# Patient Record
Sex: MALE | URBAN - METROPOLITAN AREA
[De-identification: names, ages, dates, MRNs, and addresses within clinical notes are randomized per-mention and may not be internally consistent; named-entity substitution may affect disease eponyms.]

---
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
## 2025-01-02 ENCOUNTER — OFFICE VISIT (OUTPATIENT)
Dept: OBGYN CLINIC | Facility: CLINIC | Age: 20
End: 2025-01-02
Payer: COMMERCIAL

## 2025-01-02 VITALS — BODY MASS INDEX: 31.29 KG/M2 | WEIGHT: 231 LBS | HEIGHT: 72 IN

## 2025-01-02 DIAGNOSIS — S83.282A ACUTE LATERAL MENISCUS TEAR OF LEFT KNEE, INITIAL ENCOUNTER: ICD-10-CM

## 2025-01-02 DIAGNOSIS — S83.512S RUPTURE OF ANTERIOR CRUCIATE LIGAMENT OF LEFT KNEE, SEQUELA: Primary | ICD-10-CM

## 2025-01-02 PROCEDURE — 99213 OFFICE O/P EST LOW 20 MIN: CPT | Performed by: STUDENT IN AN ORGANIZED HEALTH CARE EDUCATION/TRAINING PROGRAM

## 2025-01-02 NOTE — PROGRESS NOTES
Ortho Sports Medicine Knee Follow Up Visit     Assesment:     19 y.o. male nearly 6 months status post left knee arthroscopy with ACL reconstruction using quadriceps tendon autograft and lateral meniscus repair performed on 7/9/2024    Plan:  The patient's diagnosis and treatment were discussed at length today. We discussed no treatment, non-operative treatment, and operative treatment.    Efraín presents today nearly 6 months status post left knee arthroscopy with ACL reconstruction using quadriceps tendon autograft and lateral meniscus pair performed 7/9/2024.  He is overall doing extremely well at this time.  We did have a lengthy discussion on continuing outpatient physical therapy versus transition to home exercise program and he mentioned that he will discuss with his mom his co-pay about whether or not he wishes to continue outpatient PT.  We also had a lengthy discussion on his return to bowling and I discussed with him he should use caution as his left leg is his plant leg. I explained this is somewhat of a high risk maneuver and it would be safer to avoid.  Lastly I discussed with him it is still too soon to do any skiing and he was in agreement with this.  He can continue ice and over-the-counter medication as needed for pain relief.  I will see him back in 3 months for 1 last follow-up visit.    Conservative treatment:    Ice to knee for 20 minutes at least 1-2 times daily.  OTC NSAIDS prn for pain.  Discussed return to bowling as his left leg is his plant leg.  Discussed too soon to return to skiing.   Discussed continuing PT vs. Home Exercise Program.    Imaging:    All imaging from today was reviewed by myself and explained to the patient.       Injection:    No Injection planned at this time.      Surgery:     No surgery is recommended at this point, continue with conservative treatment plan as noted.    Follow up:    Return in about 3 months (around 4/2/2025) for Recheck.        Chief Complaint    Patient presents with    Left Knee - Follow-up     Feeling really good          History of Present Illness:    The patient is returns for follow up nearly 6 months status post left knee arthroscopy with ACL reconstruction using quadriceps tendon autograft and lateral meniscus repair performed on 7/9/2024.  He states that he is overall doing well at this time.  He continues to be compliant with outpatient physical therapy progressing on his strength.  He continues to be compliant with his activity restrictions.  He does get some discomfort after prolonged activity, however it improves with rest. He denies any instability.  He denies any mechanical catching or locking.  He denies any new injury or trauma.  He denies any numbness or tingling.  He is overall happy with his progression and postsurgical outcomes.    Past Medical, Social and Family History:  History reviewed. No pertinent past medical history.  Past Surgical History:   Procedure Laterality Date    OK ARTHRS AIDED ANT CRUCIATE LIGM RPR/AGMNTJ/RCNSTJ Left 7/9/2024    Procedure: ARTHROSCOPIC RECONSTRUCTION ANTERIOR CRUCIATE LIGAMENT (ACL) WITH AUTOGRAFT, LATERAL MENISCUS REPAIR;  Surgeon: Abel Lorenzo DO;  Location: [facility] OR;  Service: Orthopedics     No Known Allergies  Current Outpatient Medications on File Prior to Visit   Medication Sig Dispense Refill    naproxen (NAPROSYN) 250 mg tablet Take 250 mg by mouth 2 (two) times a day with meals (Patient taking differently: Take 250 mg by mouth as needed)       No current facility-administered medications on file prior to visit.     Social History     Socioeconomic History    Marital status: Single     Spouse name: Not on file    Number of children: Not on file    Years of education: Not on file    Highest education level: Not on file   Occupational History    Not on file   Tobacco Use    Smoking status: Never     Passive exposure: Never    Smokeless tobacco: Never   Substance and Sexual Activity     Alcohol use: Never    Drug use: Yes     Types: Marijuana    Sexual activity: Not on file   Other Topics Concern    Not on file   Social History Narrative    Not on file     Social Drivers of Health     Financial Resource Strain: Not on file   Food Insecurity: Not on file   Transportation Needs: Not on file   Physical Activity: Not on file   Stress: Not on file   Social Connections: Not on file   Intimate Partner Violence: Not on file   Housing Stability: Not on file         I have reviewed the past medical, surgical, social and family history, medications and allergies as documented in the EMR.    Review of systems: ROS is negative other than that noted in the HPI.  Constitutional: Negative for fatigue and fever.      Physical Exam:    Height 6' (1.829 m), weight 105 kg (231 lb).    General/Constitutional: NAD, well developed, well nourished  HENT: Normocephalic, atraumatic  CV: Intact distal pulses, regular rate  Resp: No respiratory distress or labored breathing  Lymphatic: No lymphadenopathy palpated  Neuro: Alert and Oriented x 3, no focal deficits  Psych: Normal mood, normal affect, normal judgement, normal behavior  Skin: Warm, dry, no rashes, no erythema      Knee Exam (focused):  Visual inspection of the Left knee demonstrates normal contour without atrophy.   Some shakiness with left single-leg squat  Healed previous incisions   There is no significant erythema or edema.    No significant joint effusion   Range of motion is full from 0-130 degrees of flexion   Able to straight leg raise   No incisional tender to palpation  - medial joint line tenderness, - lateral joint line tenderness  - medial Jose's, - lateral Jose's  1A Lachman exam, Stable posterior drawer  Stable to varus and valgus stress at both 0 and 30°  Patella tracks normally.  No J sign.  No apprehension.  Translation is approximately 2 quadrants and is equal to the contralateral side  Patellar eversion is similar to the contralateral  side    Examination of the patient's ipsilateral hip demonstrates full painless range of motion.  No crepitus.           LE NV Exam: +2 DP/PT pulses bilaterally  Sensation intact to light touch L2-S1 bilaterally    No calf tenderness to palpation bilaterally      Knee Imaging    No imaging was performed today      Scribe Attestation      I,:  Demar Murphy am acting as a scribe while in the presence of the attending physician.:       I,:  Abel Lorenzo, DO personally performed the services described in this documentation    as scribed in my presence.:

## 2025-01-06 ENCOUNTER — OFFICE VISIT (OUTPATIENT)
Dept: PHYSICAL THERAPY | Facility: CLINIC | Age: 20
End: 2025-01-06
Payer: COMMERCIAL

## 2025-01-06 DIAGNOSIS — S83.512S RUPTURE OF ANTERIOR CRUCIATE LIGAMENT OF LEFT KNEE, SEQUELA: Primary | ICD-10-CM

## 2025-01-06 DIAGNOSIS — S83.282A ACUTE LATERAL MENISCUS TEAR OF LEFT KNEE, INITIAL ENCOUNTER: ICD-10-CM

## 2025-01-06 PROCEDURE — 97112 NEUROMUSCULAR REEDUCATION: CPT

## 2025-01-06 PROCEDURE — 97110 THERAPEUTIC EXERCISES: CPT

## 2025-01-06 PROCEDURE — 97530 THERAPEUTIC ACTIVITIES: CPT

## 2025-01-06 NOTE — PROGRESS NOTES
"Daily Note     Today's date: 2025  Patient name: Efraín Bo  : 2005  MRN: [id number]  Referring provider: Abel Lorenzo DO  Dx:   Encounter Diagnosis     ICD-10-CM    1. Rupture of anterior cruciate ligament of left knee, sequela  S83.512S       2. Acute lateral meniscus tear of left knee, initial encounter  S83.282A                      Subjective: Pt reports he is doing really well, just some mild soreness after long days at work.       Objective: See treatment diary below      Assessment: Tolerated treatment well. Continued to work on single leg stability and strengthening as well as quick lateral movements with good tolerance. He also introduces a light job 30\" on 30\" off which he did very well with. Patient demonstrated fatigue post treatment, exhibited good technique with therapeutic exercises, and would benefit from continued PT      Plan: Continue per plan of care.  Progress treatment as tolerated.       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[employer], skiing, bowling  Impairments: L knee flexion ROM, quad control/strength, LLE strength  MedBridge Code: [email or web address]   POC expiration: 2025      POC Expires Reeval for Medicare to be completed Unit LImit Auth Expiration Date PT/OT/STVisit Limit   2025 By visit   N/a N/a 24 60    Completed on visit                  TREATMENT DIARY    Auth Status:  Approved 11/5 11/11 11/25 12/3 12/9 12/16 12/23 12/31 1/6   Visit # 24 25 26 27 28 29 30 31 32   Visits remaining  36 35 34 33 32 31 30 29 28               Manuals                                                            Neuro Re-Ed            SLS on foam            SL clock balance            SLS Trampoline " "ball toss            Ther Ex            Bike for ROM 8' 8' 8' 8' Elliptical 8' 8' 8' TM 8' 8'   Mini squat BOSU 20x BOSU 20x          Leg press 2x10 LLE   85# 2x10 LLE   85# 2x10 LLE   85# 2x10 LLE 85#  2x10 LLE 85#  2x10 LLE 95#  2x10 LLE 95#  2x10 LLE  LLE  10x2   Fwd lunge BOSU 20x BOSU 20x          Lateral lunge BOSU 20x BOSU 20x          SL pistol squat to chair        2x10 10x2   SL sit to stand on high low table       x10      Seated leg ext machine  15#  10x2 15#  10x2 15# 2x10 15# 2x10  25#  10x2 25# 2x10 35#  10x2   Eccentric fwd step down            High step ups       12\" step up   20# KB in L hand 12\" step up   20# KB in L hand  2x10 12\" step up   20# KB in L hand  2x10   Lateral step down      Quick  10x2 Quick  10x2 Quick 2x10 Quick  10x2   Eccentric step  heel tap downs            CC walk outs  99# f-b 88# s-s x10ea                       Ther Activity            Fwd landing off step onto LLE 6\" step 10x 6\" step 10x 6\" step 10x 8\" step 10x 8\" step x10 8\" step x10 8\" step x10 8\" step x10 8\" step x10   Fwd landing onto LLE off step into lateral hop to R 6\" step 10x 6\" step 10x 6\" step 10x 8\" step x10 8\" step x10 8\" step x10 8\" step x10 8\" step x10 8\" step x10   Reformer hops    LLE 2x10 LLE 2x10       Standing hops f-b, s-s 10ea X10 ea X10 ea 10ea 10ea X10 ea X10 ea 10ea 10   Skaters jumps  X10 ea X10 ea 10ea 10ea X10 ea X10 ea 10ea 10   Single leg hop in place    10 10x x10 x10 10x 10   High knees/butt kicks    2 laps ea 2 laps ea 2 laps 2 laps 2 laps 2 laps   Agility Ladder  6 laps  6 laps nv       Side shuffles   2 laps 2 laps 2 laps 2 laps 2 laps 2 laps 2 laps   Carioca     2 laps 2 laps 2 laps 2 laps 2 laps 2 laps   Fwd jog    2 laps nv       Gait Training                                    Modalities                                               "

## 2025-01-13 ENCOUNTER — OFFICE VISIT (OUTPATIENT)
Dept: PHYSICAL THERAPY | Facility: CLINIC | Age: 20
End: 2025-01-13
Payer: COMMERCIAL

## 2025-01-13 DIAGNOSIS — S83.512S RUPTURE OF ANTERIOR CRUCIATE LIGAMENT OF LEFT KNEE, SEQUELA: Primary | ICD-10-CM

## 2025-01-13 DIAGNOSIS — S83.282A ACUTE LATERAL MENISCUS TEAR OF LEFT KNEE, INITIAL ENCOUNTER: ICD-10-CM

## 2025-01-13 PROCEDURE — 97530 THERAPEUTIC ACTIVITIES: CPT

## 2025-01-13 PROCEDURE — 97110 THERAPEUTIC EXERCISES: CPT

## 2025-01-13 PROCEDURE — 97112 NEUROMUSCULAR REEDUCATION: CPT

## 2025-01-13 NOTE — PROGRESS NOTES
Daily Note     Today's date: 2025  Patient name: Efraín Bo  : 2005  MRN: [id number]  Referring provider: Abel Lorenzo DO  Dx:   Encounter Diagnosis     ICD-10-CM    1. Rupture of anterior cruciate ligament of left knee, sequela  S83.512S       2. Acute lateral meniscus tear of left knee, initial encounter  S83.282A                      Subjective: Pt reports he is doing really well, just some mild soreness after long days at work.       Objective: See treatment diary below      Assessment: Tolerated treatment well. Continued to work on single leg stability and strengthening as well as quick lateral movements with good tolerance. Doing well with jogging, no pain in knee but becomes SOB quickly. Patient demonstrated fatigue post treatment, exhibited good technique with therapeutic exercises, and would benefit from continued PT      Plan: Continue per plan of care.  Progress treatment as tolerated.       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[employer], skiing, bowling  Impairments: L knee flexion ROM, quad control/strength, LLE strength  MedBridge Code: [email or web address]   POC expiration: 2025      POC Expires Reeval for Medicare to be completed Unit LImit Auth Expiration Date PT/OT/STVisit Limit   2025 By visit   N/a N/a 24 60    Completed on visit                  TREATMENT DIARY    Auth Status:  Approved 12/3 12/9 12/16 12/23 12/31 1/6 1/13   Visit # 27 28 29 30 31 32 33   Visits remaining  33 32 31 30 29 28 27             Manuals                                                  Neuro Re-Ed          SLS on foam          SL clock balance          SLS Trampoline ball toss          Ther Ex          Bike for ROM 8'  "Elliptical 8' 8' 8' TM 8' 8' 6'   Mini squat       Bosu w. # ball toss  x20   Leg press 2x10 LLE 85#  2x10 LLE 85#  2x10 LLE 95#  2x10 LLE 95#  2x10 LLE  LLE  10x2 LLE  105#  10x2   Fwd lunge          Lateral lunge          SL pistol squat to chair     2x10 10x2 10x2   SL sit to stand on high low table    x10       Seated leg ext machine 15# 2x10 15# 2x10  25#  10x2 25# 2x10 35#  10x2 35#  10x2   Eccentric fwd step down          High step ups    12\" step up   20# KB in L hand 12\" step up   20# KB in L hand  2x10 12\" step up   20# KB in L hand  2x10 12\" step up   20# KB in L hand  2x10   Lateral step down   Quick  10x2 Quick  10x2 Quick 2x10 Quick  10x2    Eccentric step  heel tap downs          CC walk outs                     Ther Activity          Fwd landing off step onto LLE 8\" step 10x 8\" step x10 8\" step x10 8\" step x10 8\" step x10 8\" step x10 8\" step x10   Fwd landing onto LLE off step into lateral hop to R 8\" step x10 8\" step x10 8\" step x10 8\" step x10 8\" step x10 8\" step x10 8\" step x10   Reformer hops LLE 2x10 LLE 2x10        Standing hops f-b, s-s 10ea 10ea X10 ea X10 ea 10ea 10 10   Skaters jumps 10ea 10ea X10 ea X10 ea 10ea 10 10   Single leg hop in place 10 10x x10 x10 10x 10 10   High knees/butt kicks 2 laps ea 2 laps ea 2 laps 2 laps 2 laps 2 laps 2 laps   Agility Ladder 6 laps nv        Side shuffles 2 laps 2 laps 2 laps 2 laps 2 laps 2 laps 2 laps   Carioca  2 laps 2 laps 2 laps 2 laps 2 laps 2 laps 2 laps   Fwd jog 2 laps nv     TM 6 min 30\" on 30\"off up to 5.3 mph   Gait Training                              Modalities                                         "

## 2025-01-20 ENCOUNTER — OFFICE VISIT (OUTPATIENT)
Dept: PHYSICAL THERAPY | Facility: CLINIC | Age: 20
End: 2025-01-20
Payer: COMMERCIAL

## 2025-01-20 DIAGNOSIS — S83.282A ACUTE LATERAL MENISCUS TEAR OF LEFT KNEE, INITIAL ENCOUNTER: ICD-10-CM

## 2025-01-20 DIAGNOSIS — S83.512S RUPTURE OF ANTERIOR CRUCIATE LIGAMENT OF LEFT KNEE, SEQUELA: Primary | ICD-10-CM

## 2025-01-20 PROCEDURE — 97112 NEUROMUSCULAR REEDUCATION: CPT

## 2025-01-20 PROCEDURE — 97110 THERAPEUTIC EXERCISES: CPT

## 2025-01-20 PROCEDURE — 97530 THERAPEUTIC ACTIVITIES: CPT

## 2025-01-20 NOTE — PROGRESS NOTES
Daily Note     Today's date: 2025  Patient name: Efraín Bo  : 2005  MRN: [id number]  Referring provider: Abel Lorenzo DO  Dx:   Encounter Diagnosis     ICD-10-CM    1. Rupture of anterior cruciate ligament of left knee, sequela  S83.512S       2. Acute lateral meniscus tear of left knee, initial encounter  S83.282A                      Subjective: Pt reports he had some discomfort of the L HS today, unsure of why besides some snow shoveling before he came to therapy.       Objective: See treatment diary below      Assessment: Tolerated treatment well. Continued to work on single leg stability and strengthening as well as quick lateral movements with good tolerance. Has some difficulty maintaining his SLS when hopping onto the LLE.  Patient demonstrated fatigue post treatment, exhibited good technique with therapeutic exercises, and would benefit from continued PT      Plan: Continue per plan of care.  Progress treatment as tolerated.       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[employer], skiing, bowling  Impairments: L knee flexion ROM, quad control/strength, LLE strength  MedBridge Code: [email or web address]   POC expiration: 2025      POC Expires Reeval for Medicare to be completed Unit LImit Auth Expiration Date PT/OT/STVisit Limit   2025 By visit   N/a N/a 24 60    Completed on visit                  TREATMENT DIARY    Auth Status:  Approved 12/3 12/9 12/16 12/23 12/31 1/6 1/13 1/20   Visit # 27 28 29 30 31 32 33 34   Visits remaining  33 32 31 30 29 28 27 26              Manuals                                                       Neuro Re-Ed           SLS on foam           SL clock balance           SLS  "Trampoline ball toss           Ther Ex           Bike for ROM 8' Elliptical 8' 8' 8' TM 8' 8' 6' 6'   Mini squat       Bosu w. # ball toss  x20 Bosu w. # ball toss  x20   Leg press 2x10 LLE 85#  2x10 LLE 85#  2x10 LLE 95#  2x10 LLE 95#  2x10 LLE  LLE  10x2 LLE  105#  10x2 LLE  105#  10x2   Fwd lunge           Lateral lunge           SL pistol squat to chair     2x10 10x2 10x2 10x2   SL sit to stand on high low table    x10        Seated leg ext machine 15# 2x10 15# 2x10  25#  10x2 25# 2x10 35#  10x2 35#  10x2 35#  10x2   Seated Leg Curl Machine        35#  10x2   Eccentric fwd step down           High step ups    12\" step up   20# KB in L hand 12\" step up   20# KB in L hand  2x10 12\" step up   20# KB in L hand  2x10 12\" step up   20# KB in L hand  2x10 12\" step up   20# KB in L hand  2x10   Lateral step down   Quick  10x2 Quick  10x2 Quick 2x10 Quick  10x2     Eccentric step  heel tap downs           CC walk outs                       Ther Activity           Fwd landing off step onto LLE 8\" step 10x 8\" step x10 8\" step x10 8\" step x10 8\" step x10 8\" step x10 8\" step x10 8\" step x10   Fwd landing onto LLE off step into lateral hop to R 8\" step x10 8\" step x10 8\" step x10 8\" step x10 8\" step x10 8\" step x10 8\" step x10 8\" step x10   Reformer hops LLE 2x10 LLE 2x10         Standing hops f-b, s-s 10ea 10ea X10 ea X10 ea 10ea 10 10 10   Skaters jumps 10ea 10ea X10 ea X10 ea 10ea 10 10 10   Single leg hop in place 10 10x x10 x10 10x 10 10 10   High knees/butt kicks 2 laps ea 2 laps ea 2 laps 2 laps 2 laps 2 laps 2 laps 2 laps   Agility Ladder 6 laps nv         Side shuffles 2 laps 2 laps 2 laps 2 laps 2 laps 2 laps 2 laps 2 laps   Carioca  2 laps 2 laps 2 laps 2 laps 2 laps 2 laps 2 laps 2 laps   Fwd jog 2 laps nv     TM 6 min 30\" on 30\"off up to 5.3 mph nv   Gait Training                                 Modalities                                            "

## 2025-01-27 ENCOUNTER — OFFICE VISIT (OUTPATIENT)
Dept: PHYSICAL THERAPY | Facility: CLINIC | Age: 20
End: 2025-01-27
Payer: COMMERCIAL

## 2025-01-27 DIAGNOSIS — S83.282A ACUTE LATERAL MENISCUS TEAR OF LEFT KNEE, INITIAL ENCOUNTER: ICD-10-CM

## 2025-01-27 DIAGNOSIS — S83.512S RUPTURE OF ANTERIOR CRUCIATE LIGAMENT OF LEFT KNEE, SEQUELA: Primary | ICD-10-CM

## 2025-01-27 PROCEDURE — 97110 THERAPEUTIC EXERCISES: CPT

## 2025-01-27 PROCEDURE — 97530 THERAPEUTIC ACTIVITIES: CPT

## 2025-01-27 PROCEDURE — 97112 NEUROMUSCULAR REEDUCATION: CPT

## 2025-01-27 NOTE — PROGRESS NOTES
Daily Note     Today's date: 2025  Patient name: Efraín Bo  : 2005  MRN: [id number]  Referring provider: Abel Lorenzo DO  Dx:   Encounter Diagnosis     ICD-10-CM    1. Rupture of anterior cruciate ligament of left knee, sequela  S83.512S       2. Acute lateral meniscus tear of left knee, initial encounter  S83.282A                      Subjective: Pt reports he was really busy with work all weekend and his knee felt good t/o.     Objective: See treatment diary below      Assessment: Tolerated treatment well. Continued to work on single leg stability and strengthening as well as quick lateral movements with good tolerance. Has some difficulty maintaining his SLS when hopping onto the LLE. Jogging lighter on his feet with no pain but still quickly becomes SOB.   Patient demonstrated fatigue post treatment, exhibited good technique with therapeutic exercises, and would benefit from continued PT      Plan: Continue per plan of care.  Progress treatment as tolerated.       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[employer], skiing, bowling  Impairments: L knee flexion ROM, quad control/strength, LLE strength  MedBridge Code: [email or web address]   POC expiration: 2025      POC Expires Reeval for Medicare to be completed Unit LImit Auth Expiration Date PT/OT/STVisit Limit   2025 By visit   N/a N/a 24 60    Completed on visit                  TREATMENT DIARY    Auth Status:  Approved 12/3 12/9 12/16 12/23 12/31 1/6 1/13 1/20 1/27   Visit # 27 28 29 30 31 32 33 34 35   Visits remaining  33 32 31 30 29 28 27 26 25               Manuals                                                            Neuro Re-Ed            SLS on foam  "           SL clock balance            SLS Trampoline ball toss            Ther Ex            Bike for ROM 8' Elliptical 8' 8' 8' TM 8' 8' 6' 6' 6'   Mini squat       Bosu w. # ball toss  x20 Bosu w. # ball toss  x20 Bosu w. # ball toss  x20   Leg press 2x10 LLE 85#  2x10 LLE 85#  2x10 LLE 95#  2x10 LLE 95#  2x10 LLE  LLE  10x2 LLE  105#  10x2 LLE  105#  10x2 LLE  105#  10x2   Fwd lunge            Lateral lunge            SL pistol squat to chair     2x10 10x2 10x2 10x2 10x2   SL sit to stand on high low table    x10         Seated leg ext machine 15# 2x10 15# 2x10  25#  10x2 25# 2x10 35#  10x2 35#  10x2 35#  10x2 40#  10x2   Seated Leg Curl Machine        35#  10x2 40#  10x2   Eccentric fwd step down            High step ups    12\" step up   20# KB in L hand 12\" step up   20# KB in L hand  2x10 12\" step up   20# KB in L hand  2x10 12\" step up   20# KB in L hand  2x10 12\" step up   20# KB in L hand  2x10 12\" step up   20# KB in L hand  2x10   Lateral step down   Quick  10x2 Quick  10x2 Quick 2x10 Quick  10x2      Eccentric step  heel tap downs            CC walk outs                         Ther Activity            Fwd landing off step onto LLE 8\" step 10x 8\" step x10 8\" step x10 8\" step x10 8\" step x10 8\" step x10 8\" step x10 8\" step x10 8\" step x10   Fwd landing onto LLE off step into lateral hop to R 8\" step x10 8\" step x10 8\" step x10 8\" step x10 8\" step x10 8\" step x10 8\" step x10 8\" step x10 8\" step x10   Reformer hops LLE 2x10 LLE 2x10          Standing hops f-b, s-s 10ea 10ea X10 ea X10 ea 10ea 10 10 10 10   Skaters jumps 10ea 10ea X10 ea X10 ea 10ea 10 10 10 10   Single leg hop in place 10 10x x10 x10 10x 10 10 10 10   High knees/butt kicks 2 laps ea 2 laps ea 2 laps 2 laps 2 laps 2 laps 2 laps 2 laps 2 laps   Agility Ladder 6 laps nv          Side shuffles 2 laps 2 laps 2 laps 2 laps 2 laps 2 laps 2 laps 2 laps 2 laps   [place]ari[place]  2 laps 2 laps 2 laps 2 laps 2 laps 2 laps 2 laps 2 laps 2 laps   Fwd jog 2 " "laps nv     TM 6 min 30\" on 30\"off up to 5.3 mph nv TM 6 min 30\" on 30\"off up to 5.3 mph   Gait Training                                    Modalities                                               "

## 2025-02-04 ENCOUNTER — EVALUATION (OUTPATIENT)
Dept: PHYSICAL THERAPY | Facility: CLINIC | Age: 20
End: 2025-02-04
Payer: COMMERCIAL

## 2025-02-04 ENCOUNTER — TELEPHONE (OUTPATIENT)
Dept: OBGYN CLINIC | Facility: HOSPITAL | Age: 20
End: 2025-02-04

## 2025-02-04 DIAGNOSIS — S83.512S RUPTURE OF ANTERIOR CRUCIATE LIGAMENT OF LEFT KNEE, SEQUELA: Primary | ICD-10-CM

## 2025-02-04 DIAGNOSIS — S83.282A ACUTE LATERAL MENISCUS TEAR OF LEFT KNEE, INITIAL ENCOUNTER: ICD-10-CM

## 2025-02-04 PROCEDURE — 97110 THERAPEUTIC EXERCISES: CPT

## 2025-02-04 PROCEDURE — 97112 NEUROMUSCULAR REEDUCATION: CPT

## 2025-02-04 NOTE — PROGRESS NOTES
PT Re-Evaluation     Today's date: 2025  Patient name: Efraín Bo  : 2005  MRN: [id number]  Referring provider: Abel Lorenzo DO  Dx:   Encounter Diagnosis     ICD-10-CM    1. Rupture of anterior cruciate ligament of left knee, sequela  S83.512S       2. Acute lateral meniscus tear of left knee, initial encounter  S83.282A                      Assessment  Impairments: activity intolerance    Assessment details: Efraín Bo is a pleasant 18 y.o. male who presents with L surgical arthroscopy of the left knee with lateral meniscus repair and ACL autograft quadriceps reconstruction on 2024. He is ambulating with bilateral axillary crutches w/ TTWB. He has decreased L knee flexion ROM, poor quad activation, decreased L hip strength. 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 These signs and symptoms are consistent with Rupture of anterior cruciate ligament of left knee, sequela, Acute lateral meniscus tear of left knee, subsequent encounter. He will benefit from skilled PT to address impairments and return to PLOF    9/3/2024: Patient presents 8 weeks s/p L surgical arthroscopy of L knee with lateral meniscus repair and ACL autograft quadriceps reconstruction on 2024. He presents with normal gait without brace. He has normal L hip strength, but does have L knee weakness as expected 8 weeks post op. He has returned to work recently with no increase in pain. He is not yet back to descending stairs reciprocally or skiing or bowling. He will benefit from skilled PT to continue addressing impairments and progressing through protocol to return to PLOF    2024: Patient is 17 weeks s/p L surgical arthroscopy of L knee with lateral meniscus repair and ACL autograft quadriceps reconstruction on 2024. He has normal hip and knee strength and normal L knee ROM. He does demonstrate asymmetry between L and R during hop tests. These  impairments limit him with running, lateral movements at work, being on his feet all day at work, bowling, skiing, descending stairs. He will benefit from skilled PT to address impairments and return to PLOF    12/31/2024: Patient is 6 months s/p L surgical arthroscopy of L knee with lateral meniscus repair and ACL autograft quadriceps reconstruction on 7/9/2024. He continues to have normal hip and knee ROM. He has weakness of L knee which is observed during functional tests like a single leg squat/single leg chair squat. His hop tests have improved a lot since last re-evaluation but is still limited compared to RLE. He is limited primarily with skiing and bowling which he has not yet tried. Discussed with him continuing PT vs not continuing. I feel he can benefit from continued strengthening of his LLE for more functional activities and with plyometrics. He could do this with an HEP but I have concern that with a busy work schedule/lifestyle he may not be as diligent with an HEP to continue maximizing and progressing strength which is why he could benefit from continued PT.    2/4/2025: Patient is 7 months s/p L surgical arthroscopy of L knee with lateral meniscus repair and ACL autograft quadriceps reconstruction on 7/9/2024. He continues to have normal and good L knee ROM and resistive strength testing. His hop tests have improved and are relatively equal to contralateral limb. His single leg chair squat has also improved. He at this time has very minimal impairments that will continue to progress with HEP. He is not limited functionally. Discussed plan of PT with him to trial independent HEP through end of February 2025. He may within that time schedule a follow up with PT if needed, otherwise will discharge him on 3/1/2025.     Prognosis details: Positive prognostic indicators include positive attitude toward recovery, motivated to improve, high self-efficacy, good understanding of condition, realistic  expectations.  Negative prognostic indicators include chronicity of symptoms    Goals  STG to be achieved in 6 weeks  Patient will have improved L knee flexion ROM to at least 120 degrees.(Met)  Patient will have improved gross L knee and hip strength to 4+/5 (met)  Ambulate with no AD(met)  Ascend/descend stairs non reciprocal without AD(met)  Patient will be independent with HEP.(Met)    LTG to be achieved in 12 weeks  Patient will be able to ascend and descend stairs reciprocally no AD(met)  Patient will be able to return to work full time(met)    New STG to be met 6 weeks from 11/5  -able to run(met)  -able to descend stairs reciprocally(met)  -single hop and triple hop test WNLs(ongoing - good progress)    New LTG to be met 12 weeks from 11/5  -able to ski(ongoing)  -able to bowl(not yet tried by patient)      Plan  Patient would benefit from: skilled physical therapy  Planned modality interventions: cryotherapy and thermotherapy: hydrocollator packs    Planned therapy interventions: balance, home exercise program, gait training, functional ROM exercises, body mechanics training, joint mobilization, manual therapy, neuromuscular re-education, therapeutic exercise, therapeutic activities, stretching, strengthening, flexibility and patient/caregiver education    Frequency: 1x week (as needed)  Plan of Care beginning date: 2/4/2025  Plan of Care expiration date: 3/1/2025  Treatment plan discussed with: patient        Subjective Evaluation    History of Present Illness  Date of surgery: 7/9/2024  Mechanism of injury: L surgical arthroscopy of the left knee with lateral meniscus repair and ACL autograft quadriceps reconstruction on 7/9/2024. Notes overall pain has been minimal    Ambulating with crutches - TTWB for next 2 weeks. Is going up/down the stairs one at a time. Is icing and elevating. Not yet driving    Starts [employer] in late August - standing, squatting, on his feet, and turning. Wants to go back  to skiing and bowling    9/3/2024: Patient went back to work over this past weekend - notes both knees were a lot sore which he expected with just going back. He did wear a brace at work. Is going up the stairs normally but still has difficulty with going down the stairs normally. Has not went bowling yet. Overall doing well and does not wear the brace for normal daily activities.     2024: Patient notes doing well overall. He still gets soreness after standing/walking a lot at work. Has not yet tried bowling. He does not feel confident in running yet. He has some difficulty with going down the stairs too.     2024: Patient is doing well. Notes more pain in his R knee compared to his L knee. He has not yet done any bowling or skiing. Sees Dr. Lorenzo on .     2025: Patient reports doing well overall. He has been working a lot more and will only get soreness at the end of the day. Notes his R knee has been bothering him more than his L. Has not tried bowling just because he has been busy.   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, skiing, bowling  Pain  Current pain ratin  At best pain ratin  At worst pain ratin  Relieving factors: ice          Objective     Observations     Additional Observation Details  No signs/symptoms of infection    Active Range of Motion   Left Knee   Flexion: 145 degrees   Extension: WFL    Right Knee   Normal active range of motion    Strength/Myotome Testing     Left Hip   Planes of Motion   Flexion: 5  Extension: 5  Abduction: 5    Right Hip   Planes of Motion   Flexion: 5  Extension: 5  Abduction: 5    Left Knee   Flexion: 5  Extension: 5  Quadriceps contraction: good    Right Knee   Flexion: 5  Extension: 5    Ambulation   Weight-Bearing Status   Weight-Bearing Status (Left): full weight bearing   Assistive device used: none    Functional Assessment        Comments  Single leg chair squat on LLE: WNLs  Single hop  "distance   Left:   Trial 1: 129 (cm)  Trial 2: 131 (cm)   Trial 2: 127 (cm)   Right:   Trial 1: 133 (cm)   Triple hop distance   Left:   Trial 1: 12 (ft)   Right:   Trial 1: 13 (feet)             Precautions: SEE ATTACHED PROTOCOL; TTWB for 2 weeks from 7/16 then WBAT w/ crutches  DOS 7/9/2024  Surgery: surgical arthroscopy of the left knee with lateral meniscus repair and ACL autograft quadriceps reconstruction   Functional Limitations: walking, stairs, 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, skiing, [place]  Impairments: L knee flexion ROM, quad control/strength, LLE strength  MedBridge Code: [email or web address]   POC expiration: 3/1/2025      POC Expires Reeval for Medicare to be completed Unit LImit Auth Expiration Date PT/OT/STVisit Limit   3/1/53247 By visit   N/a N/a 12/31/24 60    Completed on visit                  TREATMENT DIARY    Auth Status:  Approved 12/23 12/31 1/6 1/13 1/20 1/27 2/4     Visit # 30 31 32 33 34 35 36     Visits remaining  30 29 28 27 26 25 55                 Manuals                                                            Neuro Re-Ed            SLS on foam            SL clock balance            SLS Trampoline ball toss            Ther Ex            Bike for ROM 8' TM 8' 8' 6' 6' 6' 6'     Mini squat    Bosu w. # ball toss  x20 Bosu w. # ball toss  x20 Bosu w. # ball toss  x20 Bosu w. # ball toss  x20     Leg press 2x10 LLE 95#  2x10 LLE  LLE  10x2 LLE  105#  10x2 LLE  105#  10x2 LLE  105#  10x2 LLE  115#  10x2     Fwd lunge            Lateral lunge            SL pistol squat to chair  2x10 10x2 10x2 10x2 10x2 2x10     SL sit to stand on high low table             Seated leg ext machine 25#  10x2 25# 2x10 35#  10x2 35#  10x2 35#  10x2 40#  10x2 40# 2x10     Seated Leg Curl Machine     35#  10x2 40#  10x2 40# 2x10     Eccentric fwd step down            High step ups 12\" step up   20# KB in L hand 12\" step up   20# KB in L hand  2x10 12\" step up   20# KB in L hand  2x10 12\" " "step up   20# KB in L hand  2x10 12\" step up   20# KB in L hand  2x10 12\" step up   20# KB in L hand  2x10 12\" step up   20# KB in L hand  2x10     Lateral step down Quick  10x2 Quick 2x10 Quick  10x2         Eccentric step  heel tap downs            CC walk outs                         Ther Activity            Fwd landing off step onto LLE 8\" step x10 8\" step x10 8\" step x10 8\" step x10 8\" step x10 8\" step x10      Fwd landing onto LLE off step into lateral hop to R 8\" step x10 8\" step x10 8\" step x10 8\" step x10 8\" step x10 8\" step x10 8\" step x10     Reformer hops            Standing hops f-b, s-s X10 ea 10ea 10 10 10 10      Skaters jumps X10 ea 10ea 10 10 10 10 10     Single leg hop in place x10 10x 10 10 10 10 10     High knees/butt kicks 2 laps 2 laps 2 laps 2 laps 2 laps 2 laps 2 laps     Agility Ladder            Side shuffles 2 laps 2 laps 2 laps 2 laps 2 laps 2 laps 2 laps     Carioca  2 laps 2 laps 2 laps 2 laps 2 laps 2 laps 2 laps     Fwd jog    TM 6 min 30\" on 30\"off up to 5.3 mph nv TM 6 min 30\" on 30\"off up to 5.3 mph TM 6 min 1' on 30\"off up to 5.3 mph     Gait Training                                    Modalities                                                 "

## 2025-02-25 ENCOUNTER — TELEPHONE (OUTPATIENT)
Dept: OBGYN CLINIC | Facility: CLINIC | Age: 20
End: 2025-02-25

## 2025-03-03 ENCOUNTER — TELEPHONE (OUTPATIENT)
Dept: OBGYN CLINIC | Facility: CLINIC | Age: 20
End: 2025-03-03

## 2025-03-19 ENCOUNTER — TELEPHONE (OUTPATIENT)
Dept: OBGYN CLINIC | Facility: CLINIC | Age: 20
End: 2025-03-19

## (undated) DEVICE — ANTIBACTERIAL UNDYED BRAIDED (POLYGLACTIN 910), SYNTHETIC ABSORBABLE SUTURE: Brand: COATED VICRYL

## (undated) DEVICE — SURGI KIT INSTRUMENT ORGANIZER

## (undated) DEVICE — ARTHROSCOPY FLOOR MAT

## (undated) DEVICE — SUT 2 FIBERLOOP AR-7234

## (undated) DEVICE — SPONGE SCRUB 4 PCT CHLORHEXIDINE

## (undated) DEVICE — BLADE SHAVER EXCALIBUR 4MM 13CM COOLCUT

## (undated) DEVICE — ABDOMINAL PAD: Brand: DERMACEA

## (undated) DEVICE — NEEDLE 23G X 1 1/2 SAFETY-GLIDE THIN WALL

## (undated) DEVICE — BANDAGE, ESMARK LF STR 6"X9' (20/CS): Brand: CYPRESS

## (undated) DEVICE — ACL GRAFT KNIFE 10MM

## (undated) DEVICE — GLOVE SRG BIOGEL 7.5

## (undated) DEVICE — 1820 FOAM BLOCK NEEDLE COUNTER: Brand: DEVON

## (undated) DEVICE — SUT MONOCRYL 3-0 PS-2 18 IN Y497G

## (undated) DEVICE — GLOVE INDICATOR PI UNDERGLOVE SZ 8 BLUE

## (undated) DEVICE — TUBING SUCTION 5MM X 12 FT

## (undated) DEVICE — ACL GRAFT KNIFE 9MM

## (undated) DEVICE — ACE WRAP 6 IN UNSTERILE

## (undated) DEVICE — PADDING CAST 4 IN  COTTON STRL

## (undated) DEVICE — SUT FIBERSTICK #2 50IN BLUE

## (undated) DEVICE — GLOVE SRG LF STRL BGL SKNSNS 6.5 PF

## (undated) DEVICE — NEEDLE SPINAL18G X 3.5 IN QUINCKE

## (undated) DEVICE — 3M™ STERI-STRIP™ REINFORCED ADHESIVE SKIN CLOSURES, R1547, 1/2 IN X 4 IN (12 MM X 100 MM), 6 STRIPS/ENVELOPE: Brand: 3M™ STERI-STRIP™

## (undated) DEVICE — GAUZE SPONGES,16 PLY: Brand: CURITY

## (undated) DEVICE — INTENDED FOR TISSUE SEPARATION, AND OTHER PROCEDURES THAT REQUIRE A SHARP SURGICAL BLADE TO PUNCTURE OR CUT.: Brand: BARD-PARKER SAFETY BLADES SIZE 15, STERILE

## (undated) DEVICE — 10FR FRAZIER SUCTION HANDLE: Brand: CARDINAL HEALTH

## (undated) DEVICE — ACL DISPOSABLES KIT 18 INCHES BAYONET POINT PASSING PIN (2.4 X 45 CM), 14 INCHES DRILL TIP PASSING PIN (2.4 X 35 CM), 1.1 MM X 15 INCHES (38 CM) NITINOL GUIDEWIRE, THREADED TIBIAL CANNULA, MALLEABLE GRAFT RETRACTOR, MARKING PEN, RULER (PERCENT ACCURACY = PLUS OR MINUS 1 PERCENT)

## (undated) DEVICE — SUT TIGERSTICK TIGERWIRE 50IN WHITE/BLACK

## (undated) DEVICE — CUTTER FLIPCUTTER III 6-12MM

## (undated) DEVICE — HEAVY DUTY TABLE COVER: Brand: CONVERTORS

## (undated) DEVICE — VAPR COOLPULSE 90 ELECTRODE 90 DEGREES SUCTION WITH INTEGRATED HANDPIECE: Brand: VAPR COOLPULSE

## (undated) DEVICE — INTENDED FOR TISSUE SEPARATION, AND OTHER PROCEDURES THAT REQUIRE A SHARP SURGICAL BLADE TO PUNCTURE OR CUT.: Brand: BARD-PARKER ® CARBON RIB-BACK BLADES

## (undated) DEVICE — SURGICAL GOWN, XL SMARTSLEEVE: Brand: CONVERTORS

## (undated) DEVICE — BURR RND 4MM 13CM 8 FLUTE COOLCUT

## (undated) DEVICE — SYRINGE 20ML LL

## (undated) DEVICE — GLOVE INDICATOR PI UNDERGLOVE SZ 7 BLUE

## (undated) DEVICE — CHLORAPREP HI-LITE 26ML ORANGE

## (undated) DEVICE — GLOVE SRG BIOGEL 8

## (undated) DEVICE — NEPTUNE E-SEP SMOKE EVACUATION PENCIL, COATED, 70MM BLADE, PUSH BUTTON SWITCH: Brand: NEPTUNE E-SEP

## (undated) DEVICE — TUBING ARTHROSCOPIC WAVE  MAIN PUMP

## (undated) DEVICE — CUFF TOURNIQUET 30 X 4 IN QUICK CONNECT DISP 1BLA

## (undated) DEVICE — BETHLEHEM UNIV MAJ EXT ,KIT: Brand: CARDINAL HEALTH

## (undated) DEVICE — SUT VICRYL 0 CT-1 27 IN J260H